# Patient Record
Sex: MALE | Race: WHITE
[De-identification: names, ages, dates, MRNs, and addresses within clinical notes are randomized per-mention and may not be internally consistent; named-entity substitution may affect disease eponyms.]

---

## 2018-02-21 ENCOUNTER — HOSPITAL ENCOUNTER (OUTPATIENT)
Dept: HOSPITAL 76 - SDS | Age: 58
Discharge: HOME | End: 2018-02-21
Attending: INTERNAL MEDICINE
Payer: COMMERCIAL

## 2018-02-21 VITALS — SYSTOLIC BLOOD PRESSURE: 115 MMHG | DIASTOLIC BLOOD PRESSURE: 70 MMHG

## 2018-02-21 DIAGNOSIS — Z86.010: ICD-10-CM

## 2018-02-21 DIAGNOSIS — D64.9: ICD-10-CM

## 2018-02-21 DIAGNOSIS — Z12.11: Primary | ICD-10-CM

## 2018-02-21 PROCEDURE — 0DJD8ZZ INSPECTION OF LOWER INTESTINAL TRACT, VIA NATURAL OR ARTIFICIAL OPENING ENDOSCOPIC: ICD-10-PCS | Performed by: INTERNAL MEDICINE

## 2018-02-21 PROCEDURE — 45378 DIAGNOSTIC COLONOSCOPY: CPT

## 2021-02-10 ENCOUNTER — HOSPITAL ENCOUNTER (EMERGENCY)
Dept: HOSPITAL 76 - ED | Age: 61
Discharge: HOME | End: 2021-02-10
Payer: COMMERCIAL

## 2021-02-10 VITALS — DIASTOLIC BLOOD PRESSURE: 80 MMHG | SYSTOLIC BLOOD PRESSURE: 126 MMHG
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
## 2021-02-10 DIAGNOSIS — S61.402A: Primary | ICD-10-CM

## 2021-02-10 DIAGNOSIS — Z23: ICD-10-CM

## 2021-02-10 PROCEDURE — 99283 EMERGENCY DEPT VISIT LOW MDM: CPT

## 2021-02-10 PROCEDURE — 90715 TDAP VACCINE 7 YRS/> IM: CPT

## 2021-02-10 PROCEDURE — 90471 IMMUNIZATION ADMIN: CPT

## 2021-02-10 PROCEDURE — 99282 EMERGENCY DEPT VISIT SF MDM: CPT

## 2021-02-10 NOTE — ED PHYSICIAN DOCUMENTATION
History of Present Illness





- Stated complaint


Stated Complaint: LT HAND INJ





- Chief complaint


Chief Complaint: Laceration





- History obtained from


History obtained from: Patient





- Additonal information


Additional information: 


Patient comes emergency department chief complaint of hand injury at work.  

Patient states he was pushing a large metal cage and that his hand scraped 

against the wall and this happened.  It was his left hand, and patient states it

was bleeding quite a lot.  He states his boss made him come to the emergency 

department to get checked out.  Patient denies any other complaints at this 

time.  The bleeding is stopped, and patient has full range of motion of his 

fingers, he states.  He denies any numbness or tingling.  No other injuries.  No

other complaints at this time








Review of Systems


Ten Systems: 10 systems reviewed and negative


Constitutional: reports: Reviewed and negative


Eyes: reports: Reviewed and negative


Ears: reports: Reviewed and negative


Nose: reports: Reviewed and negative


Throat: reports: Reviewed and negative


Cardiac: reports: Reviewed and negative


Respiratory: reports: Reviewed and negative


GI: reports: Reviewed and negative


: reports: Reviewed and negative


Skin: reports: Laceration (s), Reviewed and negative


Musculoskeletal: reports: Reviewed and negative


Neurologic: reports: Reviewed and negative


Psychiatric: reports: Reviewed and negative


Endocrine: reports: Reviewed and negative


Immunocompromised: reports: Reviewed and negative





PD PAST MEDICAL HISTORY





- Past Medical History


Cardiovascular: None


Endocrine/Autoimmune: None


GI: None


: Other


HEENT: None


Psych: None


Musculoskeletal: None


Derm: None


Other Past Medical History: high PSA labs





- Past Surgical History


Past Surgical History: No


General: Colonoscopy





- Present Medications


Home Medications: 


                                Ambulatory Orders











 Medication  Instructions  Recorded  Confirmed


 


Alfuzosin HCl [Uroxatral] 10 mg PO DAILY PRN 02/20/18 02/21/18


 


Cholecalciferol (Vitamin D3) 1,000 unit PO DAILY 02/20/18 02/21/18





[Vitamin D3]   


 


Multivitamin [Multivitamins] 1 each PO DAILY 02/20/18 02/21/18














- Allergies


Allergies/Adverse Reactions: 


                                    Allergies











Allergy/AdvReac Type Severity Reaction Status Date / Time


 


No Known Drug Allergies Allergy   Verified 02/10/21 08:22














- Social History


Does the pt smoke?: No


Smoking Status: Never smoker


Does the pt drink ETOH?: Yes


Does the pt have substance abuse?: No





- Immunizations


Immunizations are current?: Yes





PD ED PE NORMAL





- Vitals


Vital signs reviewed: Yes





- General


General: Alert and oriented X 3, No acute distress, Well developed/nourished





- HEENT


HEENT: Atraumatic, PERRL, EOMI, Moist mucous membranes





- Neck


Neck: Supple, no meningeal sign





- Cardiac


Cardiac: Strong equal pulses





- Respiratory


Respiratory: No respiratory distress





- Derm


Derm: Normal color, Warm and dry, Other (3 cm x 1 cm skin avulsion over the 

dorsum of left hand.  No active bleeding.  No foreign body.)





- Extremities


Extremities: No deformity, No tenderness to palpate, Normal ROM s pain, No edema





- Neuro


Neuro: Alert and oriented X 3, No motor deficit, No sensory deficit





- Psych


Psych: Normal mood, Normal affect





Results





- Vitals


Vitals: 


                               Vital Signs - 24 hr











  02/10/21





  08:18


 


Temperature 36.4 C L


 


Heart Rate 64


 


Respiratory 16





Rate 


 


Blood Pressure 140/74 H


 


O2 Saturation 99








                                     Oxygen











O2 Source                      Room air

















PD MEDICAL DECISION MAKING





- ED course


Complexity details: considered differential, d/w patient


ED course: 


I discussed with the patient that most of the wound is nonsuturable, as it is an

 extremely shallow, superficial skin avulsion.  There is a small portion of 

about 1 cm at the radial end of the wound that could potentially benefit from 

closure, although the wound would certainly heal nearly as well without closure.

  The patient has stated he prefers not to have sutures.  We have updated his 

tetanus today.  The wound has been dressed with bacitracin.  We have discussed 

the usual indications for return








Departure





- Departure


Disposition: 01 Home, Self Care


Clinical Impression: 


 Skin avulsion





Condition: Stable


Instructions:  ED Wound Care


Comments: 


Your tetanus has been updated today.